# Patient Record
Sex: FEMALE | URBAN - METROPOLITAN AREA
[De-identification: names, ages, dates, MRNs, and addresses within clinical notes are randomized per-mention and may not be internally consistent; named-entity substitution may affect disease eponyms.]

---

## 2021-01-19 ENCOUNTER — OFFICE VISIT (OUTPATIENT)
Dept: URBAN - METROPOLITAN AREA CLINIC 92 | Facility: CLINIC | Age: 71
End: 2021-01-19
Payer: MEDICARE

## 2021-01-19 DIAGNOSIS — R10.84 ABDOMINAL CRAMPING, GENERALIZED: ICD-10-CM

## 2021-01-19 DIAGNOSIS — N18.6 ESRD (END STAGE RENAL DISEASE) ON DIALYSIS: ICD-10-CM

## 2021-01-19 DIAGNOSIS — K59.01 CONSTIPATION: ICD-10-CM

## 2021-01-19 PROCEDURE — G8482 FLU IMMUNIZE ORDER/ADMIN: HCPCS | Performed by: INTERNAL MEDICINE

## 2021-01-19 PROCEDURE — 99203 OFFICE O/P NEW LOW 30 MIN: CPT | Performed by: INTERNAL MEDICINE

## 2021-01-19 PROCEDURE — 99243 OFF/OP CNSLTJ NEW/EST LOW 30: CPT | Performed by: INTERNAL MEDICINE

## 2021-01-19 NOTE — HPI-TODAY'S VISIT:
This is a 70-year-old -American female who presents today for consultation.  She is referred by Dr. Alexy Oliver.  Copy this report will be sent to his office. She notes that she has had years of diffuse abdominal pain mostly in her flanks radiating to her mid abdomen she has associated bloating and abdominal swelling.  She has symptoms daily.  There are no alleviating factors.  This is occasionally worse after eating.  She does have chronic constipation with a bowel movement every 3 to 4 days with pellet-like stools.  She does not take any stool softeners or laxatives at this time.  She was scheduled to have an EGD and colonoscopy with another provider although this with this was canceled in the ambulatory care center due to the patient's comorbid conditions.   she does not have list of her meds

## 2021-02-09 ENCOUNTER — TELEPHONE ENCOUNTER (OUTPATIENT)
Dept: URBAN - METROPOLITAN AREA CLINIC 92 | Facility: CLINIC | Age: 71
End: 2021-02-09

## 2021-02-16 ENCOUNTER — TELEPHONE ENCOUNTER (OUTPATIENT)
Dept: URBAN - METROPOLITAN AREA CLINIC 92 | Facility: CLINIC | Age: 71
End: 2021-02-16

## 2021-02-17 ENCOUNTER — OFFICE VISIT (OUTPATIENT)
Dept: URBAN - METROPOLITAN AREA MEDICAL CENTER 12 | Facility: MEDICAL CENTER | Age: 71
End: 2021-02-17
Payer: MEDICARE

## 2021-02-17 DIAGNOSIS — B37.81 CANDIDA ESOPHAGITIS: ICD-10-CM

## 2021-02-17 DIAGNOSIS — K59.09 CHRONIC CONSTIPATION: ICD-10-CM

## 2021-02-17 DIAGNOSIS — R10.84 ABDOMINAL CRAMPING, GENERALIZED: ICD-10-CM

## 2021-02-17 PROCEDURE — 43239 EGD BIOPSY SINGLE/MULTIPLE: CPT | Performed by: INTERNAL MEDICINE

## 2021-02-17 PROCEDURE — 45378 DIAGNOSTIC COLONOSCOPY: CPT | Performed by: INTERNAL MEDICINE

## 2021-03-02 ENCOUNTER — TELEPHONE ENCOUNTER (OUTPATIENT)
Dept: URBAN - METROPOLITAN AREA CLINIC 92 | Facility: CLINIC | Age: 71
End: 2021-03-02

## 2021-03-02 RX ORDER — FLUCONAZOLE 100 MG/1
2 TABLETS ON DAY 1 THEN 1 TABLET DAILY TABLET ORAL DAILY
Qty: 15 | OUTPATIENT
Start: 2021-03-03 | End: 2021-03-17

## 2021-04-05 ENCOUNTER — OFFICE VISIT (OUTPATIENT)
Dept: URBAN - METROPOLITAN AREA CLINIC 92 | Facility: CLINIC | Age: 71
End: 2021-04-05
Payer: MEDICARE

## 2021-04-05 ENCOUNTER — TELEPHONE ENCOUNTER (OUTPATIENT)
Dept: URBAN - METROPOLITAN AREA CLINIC 92 | Facility: CLINIC | Age: 71
End: 2021-04-05

## 2021-04-05 VITALS
WEIGHT: 149 LBS | HEIGHT: 63 IN | BODY MASS INDEX: 26.4 KG/M2 | SYSTOLIC BLOOD PRESSURE: 159 MMHG | HEART RATE: 94 BPM | DIASTOLIC BLOOD PRESSURE: 82 MMHG

## 2021-04-05 DIAGNOSIS — K59.01 CONSTIPATION: ICD-10-CM

## 2021-04-05 DIAGNOSIS — B37.81 CANDIDAL ESOPHAGITIS: ICD-10-CM

## 2021-04-05 DIAGNOSIS — E61.1 IRON DEFICIENCY: ICD-10-CM

## 2021-04-05 DIAGNOSIS — R10.9 ABDOMINAL PAIN: ICD-10-CM

## 2021-04-05 DIAGNOSIS — K92.1 MELENA: ICD-10-CM

## 2021-04-05 DIAGNOSIS — N18.6 ESRD (END STAGE RENAL DISEASE) ON DIALYSIS: ICD-10-CM

## 2021-04-05 PROBLEM — 46177005 END STAGE RENAL DISEASE: Status: ACTIVE | Noted: 2021-01-19

## 2021-04-05 PROCEDURE — 99214 OFFICE O/P EST MOD 30 MIN: CPT | Performed by: INTERNAL MEDICINE

## 2021-04-05 NOTE — HPI-TODAY'S VISIT:
This is a 70-year-old -American female presents today for follow-up.  She had an upper endoscopy which was notable for Candida esophagitis.  She completed a course of Diflucan.  She overall notes a decrease in her abdominal symptoms with improved pain and decreased swelling although she continues to have constipation with a bowel movement once or twice per week.  She does take MiraLAX as needed although only once or twice per week.  As she states that she has no rectal sensation of urge for defecation and has frequent fecal incontinence.  she again did not have list of meds

## 2021-04-30 ENCOUNTER — OFFICE VISIT (OUTPATIENT)
Dept: URBAN - METROPOLITAN AREA MEDICAL CENTER 28 | Facility: MEDICAL CENTER | Age: 71
End: 2021-04-30
Payer: MEDICARE

## 2021-04-30 DIAGNOSIS — K59.09 CHRONIC CONSTIPATION: ICD-10-CM

## 2021-04-30 PROCEDURE — 91120 RECTAL SENSATION TEST: CPT | Performed by: INTERNAL MEDICINE

## 2021-04-30 PROCEDURE — 91122 ANAL PRESSURE RECORD: CPT | Performed by: INTERNAL MEDICINE

## 2021-05-04 ENCOUNTER — TELEPHONE ENCOUNTER (OUTPATIENT)
Dept: URBAN - METROPOLITAN AREA CLINIC 92 | Facility: CLINIC | Age: 71
End: 2021-05-04

## 2021-05-05 ENCOUNTER — OFFICE VISIT (OUTPATIENT)
Dept: URBAN - METROPOLITAN AREA CLINIC 91 | Facility: CLINIC | Age: 71
End: 2021-05-05

## 2021-05-12 PROBLEM — 14760008 CONSTIPATION: Status: ACTIVE | Noted: 2021-01-19

## 2021-05-24 ENCOUNTER — OFFICE VISIT (OUTPATIENT)
Dept: URBAN - METROPOLITAN AREA MEDICAL CENTER 28 | Facility: MEDICAL CENTER | Age: 71
End: 2021-05-24
Payer: MEDICARE

## 2021-05-24 ENCOUNTER — LAB OUTSIDE AN ENCOUNTER (OUTPATIENT)
Dept: URBAN - METROPOLITAN AREA CLINIC 92 | Facility: CLINIC | Age: 71
End: 2021-05-24

## 2021-05-24 DIAGNOSIS — R15.9 ANAL SPHINCTER INCONTINENCE: ICD-10-CM

## 2021-05-24 DIAGNOSIS — K63.89 BACTERIAL OVERGROWTH SYNDROME: ICD-10-CM

## 2021-05-24 LAB
.IONIZED CALCIUM POC: 1.21
BUN POC: 35
CHLORIDE LVL POC: 106
CO2 POC: 28
CREATININE POC: 6.4
GLUCOSE  ISTAT: 103
HEMATOCRIT POC: 35
HEMOBLOBIN POC: 11.9
PERFORMING LAB: (no result)
POTASSIUM POC: 3.9
SODIUM LVL POC: 144

## 2021-05-24 PROCEDURE — 45341 SIGMOIDOSCOPY W/ULTRASOUND: CPT | Performed by: INTERNAL MEDICINE

## 2021-06-02 ENCOUNTER — OFFICE VISIT (OUTPATIENT)
Dept: URBAN - METROPOLITAN AREA CLINIC 91 | Facility: CLINIC | Age: 71
End: 2021-06-02

## 2021-06-09 ENCOUNTER — OFFICE VISIT (OUTPATIENT)
Dept: URBAN - METROPOLITAN AREA CLINIC 91 | Facility: CLINIC | Age: 71
End: 2021-06-09

## 2021-06-09 ENCOUNTER — CLAIMS CREATED FROM THE CLAIM WINDOW (OUTPATIENT)
Dept: URBAN - METROPOLITAN AREA CLINIC 91 | Facility: CLINIC | Age: 71
End: 2021-06-09
Payer: MEDICARE

## 2021-06-09 ENCOUNTER — CLAIMS CREATED FROM THE CLAIM WINDOW (OUTPATIENT)
Dept: URBAN - METROPOLITAN AREA CLINIC 91 | Facility: CLINIC | Age: 71
End: 2021-06-09

## 2021-06-09 DIAGNOSIS — D50.9 ANEMIA, IRON DEFICIENCY: ICD-10-CM

## 2021-06-09 PROCEDURE — 91110 GI TRC IMG INTRAL ESOPH-ILE: CPT | Performed by: INTERNAL MEDICINE

## 2021-06-11 ENCOUNTER — TELEPHONE ENCOUNTER (OUTPATIENT)
Dept: URBAN - METROPOLITAN AREA CLINIC 92 | Facility: CLINIC | Age: 71
End: 2021-06-11

## 2024-05-06 ENCOUNTER — P2P PATIENT RECORD (OUTPATIENT)
Age: 74
End: 2024-05-06

## 2024-05-22 ENCOUNTER — TELEPHONE ENCOUNTER (OUTPATIENT)
Dept: URBAN - METROPOLITAN AREA CLINIC 27 | Facility: CLINIC | Age: 74
End: 2024-05-22

## 2024-05-22 ENCOUNTER — OFFICE VISIT (OUTPATIENT)
Dept: URBAN - METROPOLITAN AREA CLINIC 27 | Facility: CLINIC | Age: 74
End: 2024-05-22
Payer: COMMERCIAL

## 2024-05-22 VITALS
HEIGHT: 63 IN | BODY MASS INDEX: 23.21 KG/M2 | SYSTOLIC BLOOD PRESSURE: 117 MMHG | HEART RATE: 76 BPM | WEIGHT: 131 LBS | DIASTOLIC BLOOD PRESSURE: 66 MMHG

## 2024-05-22 DIAGNOSIS — R63.4 WEIGHT LOSS: ICD-10-CM

## 2024-05-22 DIAGNOSIS — R10.84 GENERALIZED ABDOMINAL PAIN: ICD-10-CM

## 2024-05-22 DIAGNOSIS — R19.8 IRREGULAR BOWEL HABITS: ICD-10-CM

## 2024-05-22 DIAGNOSIS — R13.10 DYSPHAGIA: ICD-10-CM

## 2024-05-22 DIAGNOSIS — K21.9 GERD: ICD-10-CM

## 2024-05-22 PROCEDURE — 99204 OFFICE O/P NEW MOD 45 MIN: CPT | Performed by: INTERNAL MEDICINE

## 2024-05-22 RX ORDER — SACUBITRIL AND VALSARTAN 24; 26 MG/1; MG/1
TABLET, FILM COATED ORAL
Qty: 180 TABLET | Status: ACTIVE | COMMUNITY

## 2024-05-22 RX ORDER — METHOCARBAMOL 500 MG/1
TAKE 1 TABLET (500 MG) BY MOUTH IF NEEDED IN THE MORNING, AT NOON, AND AT BEDTIME FOR MUSCLE SPASMS TABLET, FILM COATED ORAL
Qty: 60 EACH | Refills: 4 | Status: ACTIVE | COMMUNITY

## 2024-05-22 RX ORDER — APIXABAN 5 MG/1
TABLET, FILM COATED ORAL
Qty: 180 TABLET | Status: ACTIVE | COMMUNITY

## 2024-05-22 RX ORDER — ATORVASTATIN CALCIUM 80 MG/1
TAKE 1 TABLET BY MOUTH ONCE DAILY TABLET, FILM COATED ORAL
Qty: 90 EACH | Refills: 3 | Status: ACTIVE | COMMUNITY

## 2024-05-22 RX ORDER — INSULIN LISPRO 100 [IU]/ML
PLEASE SEE ATTACHED FOR DETAILED DIRECTIONS INJECTION, SOLUTION INTRAVENOUS; SUBCUTANEOUS
Qty: 15 MILLILITER | Refills: 0 | Status: ACTIVE | COMMUNITY

## 2024-05-22 RX ORDER — HYDROXYZINE HYDROCHLORIDE 10 MG/1
TAKE 1 TABLET (10 MG) BY MOUTH IF NEEDED IN THE MORNING, AT NOON, AND AT BEDTIME FOR ITCHING TABLET, FILM COATED ORAL
Qty: 90 EACH | Refills: 0 | Status: ACTIVE | COMMUNITY

## 2024-05-22 RX ORDER — TOPIRAMATE 25 MG/1
CAPSULE, COATED PELLETS ORAL
Qty: 180 CAPSULE | Status: ACTIVE | COMMUNITY

## 2024-05-22 RX ORDER — OMEPRAZOLE 40 MG/1
TAKE 1 CAPSULE BY MOUTH TWICE A DAY CAPSULE, DELAYED RELEASE ORAL
Qty: 180 EACH | Refills: 1 | Status: ACTIVE | COMMUNITY

## 2024-05-22 RX ORDER — PREDNISONE 5 MG/1
PLEASE SEE ATTACHED FOR DETAILED DIRECTIONS TABLET ORAL
Qty: 218 EACH | Refills: 0 | Status: ACTIVE | COMMUNITY

## 2024-05-22 RX ORDER — LIDOCAINE 50 MG/G
PATCH CUTANEOUS
Qty: 30 PATCH | Status: ACTIVE | COMMUNITY

## 2024-05-22 RX ORDER — BLOOD SUGAR DIAGNOSTIC
CHECK BLOOD SUGAR 3X/DAY. E11.8 STRIP MISCELLANEOUS
Qty: 300 EACH | Refills: 1 | Status: ACTIVE | COMMUNITY

## 2024-05-22 RX ORDER — LANCETS
USE AS DIRECTED. INJECTS 1 X A DAY EACH MISCELLANEOUS
Qty: 100 EACH | Refills: 0 | Status: ACTIVE | COMMUNITY

## 2024-05-22 RX ORDER — AMLODIPINE BESYLATE 10 MG/1
TABLET ORAL
Qty: 90 TABLET | Status: ACTIVE | COMMUNITY

## 2024-05-22 RX ORDER — ALBUTEROL SULFATE 90 UG/1
INHALE 2 PUFFS EVERY 6 HOURS AS NEEDED FOR SHORTNESS OF BREATH AEROSOL, METERED RESPIRATORY (INHALATION)
Qty: 8.5 GRAM | Refills: 1 | Status: ACTIVE | COMMUNITY

## 2024-05-22 RX ORDER — NALOXONE HYDROCHLORIDE 4 MG/.1ML
PLEASE SEE ATTACHED FOR DETAILED DIRECTIONS SPRAY, METERED NASAL
Qty: 2 EACH | Refills: 0 | Status: ACTIVE | COMMUNITY

## 2024-05-22 RX ORDER — BOSENTAN 125 MG/1
TABLET, FILM COATED ORAL
Qty: 60 TABLET | Status: ACTIVE | COMMUNITY

## 2024-05-22 RX ORDER — HYDROCORTISONE 25 MG/G
CREAM TOPICAL
Qty: 30 GRAM | Status: ACTIVE | COMMUNITY

## 2024-05-22 RX ORDER — INSULIN LISPRO 100 [IU]/ML
INJECT 35 UNITS UNDER THE SKIN WITH BREAKFAST, WITH LUNCH, AND WITH EVENING MEAL INJECTION, SOLUTION INTRAVENOUS; SUBCUTANEOUS
Qty: 105 MILLILITER | Refills: 0 | Status: ACTIVE | COMMUNITY

## 2024-05-22 RX ORDER — CALCIUM ACETATE 667 MG/1
CAPSULE ORAL
Qty: 540 CAPSULE | Status: ACTIVE | COMMUNITY

## 2024-05-22 RX ORDER — METOPROLOL SUCCINATE 25 MG/1
TAKE 1 TABLET (25 MG) BY MOUTH DAILY DO NOT CRUSH OR CHEW TABLET, EXTENDED RELEASE ORAL
Qty: 90 EACH | Refills: 0 | Status: ACTIVE | COMMUNITY

## 2024-05-22 RX ORDER — GABAPENTIN 300 MG/1
CAPSULE ORAL
Qty: 60 CAPSULE | Status: ACTIVE | COMMUNITY

## 2024-05-22 RX ORDER — HYDRALAZINE HYDROCHLORIDE 25 MG/1
TAKE 1 TABLET BY MOUTH IF NEEDED IN THE MORNING AND AT BEDTIME (HIGH BLOOD PRESSURE) TABLET ORAL
Qty: 90 EACH | Refills: 0 | Status: ACTIVE | COMMUNITY

## 2024-05-22 RX ORDER — INSULIN LISPRO 100 [IU]/ML
INJECTION, SOLUTION INTRAVENOUS; SUBCUTANEOUS
Qty: 15 MILLILITER | Status: ACTIVE | COMMUNITY

## 2024-05-22 RX ORDER — HYOSCYAMINE SULFATE 0.12 MG/1
1 OR 2 TABLETS AS NEEDED FOR ABDOMINAL PAIN TABLET ORAL EVERY 6 HOURS
Qty: 90 | Refills: 2 | OUTPATIENT
Start: 2024-05-22 | End: 2024-08-20

## 2024-05-22 RX ORDER — LOSARTAN POTASSIUM 25 MG/1
1 TAB(S) BY MOUTH AT BEDTIME INSTR:OK TO START 1/2 PILL HOLD ON NIGHT BEFORE DIALYSIS TABLET, FILM COATED ORAL
Qty: 90 EACH | Refills: 0 | Status: ACTIVE | COMMUNITY

## 2024-05-22 RX ORDER — DULOXETINE 20 MG/1
TAKE 1 CAPSULE (20 MG) BY MOUTH ONCE DAILY DO NOT CRUSH OR CHEW CAPSULE, DELAYED RELEASE ORAL
Qty: 90 EACH | Refills: 2 | Status: ACTIVE | COMMUNITY

## 2024-05-22 RX ORDER — CYCLOBENZAPRINE HYDROCHLORIDE 10 MG/1
TABLET, FILM COATED ORAL
Qty: 45 TABLET | Status: ACTIVE | COMMUNITY

## 2024-05-22 RX ORDER — ROPINIROLE HYDROCHLORIDE 0.5 MG/1
TABLET, FILM COATED ORAL
Qty: 90 TABLET | Status: ACTIVE | COMMUNITY

## 2024-05-22 RX ORDER — ADALIMUMAB 40MG/0.4ML
KIT SUBCUTANEOUS
Qty: 2 EACH | Status: ACTIVE | COMMUNITY

## 2024-05-22 RX ORDER — CALCIUM ACETATE 667 MG/1
TAKE 2 TABLETS BY MOUTH WITH EACH MEAL 4 TIMES DAILY AS DIRECTED TABLET ORAL
Qty: 240 EACH | Refills: 0 | Status: ACTIVE | COMMUNITY

## 2024-05-22 RX ORDER — PEN NEEDLE, DIABETIC 32GX 5/32"
BD NANO PEN NEEDLES INJECTS INSULIN ONCE DAILY NEEDLE, DISPOSABLE MISCELLANEOUS
Qty: 100 EACH | Refills: 0 | Status: ACTIVE | COMMUNITY

## 2024-05-22 RX ORDER — METHYLPREDNISOLONE 32 MG/1
TABLET ORAL
Qty: 2 TABLET | Status: ACTIVE | COMMUNITY

## 2024-05-22 RX ORDER — SITAGLIPTIN 25 MG/1
TABLET, FILM COATED ORAL
Qty: 90 TABLET | Status: ACTIVE | COMMUNITY

## 2024-05-22 RX ORDER — GABAPENTIN 100 MG/1
CAPSULE ORAL
Qty: 12 CAPSULE | Status: ACTIVE | COMMUNITY

## 2024-05-23 ENCOUNTER — DASHBOARD ENCOUNTERS (OUTPATIENT)
Age: 74
End: 2024-05-23

## 2024-05-23 ENCOUNTER — TELEPHONE ENCOUNTER (OUTPATIENT)
Dept: URBAN - METROPOLITAN AREA CLINIC 27 | Facility: CLINIC | Age: 74
End: 2024-05-23

## 2024-06-17 ENCOUNTER — OFFICE VISIT (OUTPATIENT)
Dept: URBAN - METROPOLITAN AREA CLINIC 27 | Facility: CLINIC | Age: 74
End: 2024-06-17
Payer: COMMERCIAL

## 2024-06-17 ENCOUNTER — LAB OUTSIDE AN ENCOUNTER (OUTPATIENT)
Dept: URBAN - METROPOLITAN AREA CLINIC 27 | Facility: CLINIC | Age: 74
End: 2024-06-17

## 2024-06-17 ENCOUNTER — TELEPHONE ENCOUNTER (OUTPATIENT)
Dept: URBAN - METROPOLITAN AREA CLINIC 27 | Facility: CLINIC | Age: 74
End: 2024-06-17

## 2024-06-17 VITALS
SYSTOLIC BLOOD PRESSURE: 117 MMHG | DIASTOLIC BLOOD PRESSURE: 66 MMHG | WEIGHT: 126 LBS | BODY MASS INDEX: 22.32 KG/M2 | HEIGHT: 63 IN | HEART RATE: 76 BPM

## 2024-06-17 DIAGNOSIS — K59.09 CONSTIPATION: ICD-10-CM

## 2024-06-17 DIAGNOSIS — R10.84 GENERALIZED ABDOMINAL PAIN: ICD-10-CM

## 2024-06-17 DIAGNOSIS — K21.9 GERD: ICD-10-CM

## 2024-06-17 DIAGNOSIS — R13.19 CERVICAL DYSPHAGIA: ICD-10-CM

## 2024-06-17 PROCEDURE — 99214 OFFICE O/P EST MOD 30 MIN: CPT | Performed by: INTERNAL MEDICINE

## 2024-06-17 RX ORDER — HYDROCORTISONE 25 MG/G
CREAM TOPICAL
Qty: 30 GRAM | Status: ACTIVE | COMMUNITY

## 2024-06-17 RX ORDER — DULOXETINE 20 MG/1
TAKE 1 CAPSULE (20 MG) BY MOUTH ONCE DAILY DO NOT CRUSH OR CHEW CAPSULE, DELAYED RELEASE ORAL
Qty: 90 EACH | Refills: 2 | Status: ACTIVE | COMMUNITY

## 2024-06-17 RX ORDER — AMLODIPINE BESYLATE 10 MG/1
TABLET ORAL
Qty: 90 TABLET | Status: ACTIVE | COMMUNITY

## 2024-06-17 RX ORDER — INSULIN LISPRO 100 [IU]/ML
INJECTION, SOLUTION INTRAVENOUS; SUBCUTANEOUS
Qty: 15 MILLILITER | Status: ACTIVE | COMMUNITY

## 2024-06-17 RX ORDER — GABAPENTIN 100 MG/1
CAPSULE ORAL
Qty: 12 CAPSULE | Status: ACTIVE | COMMUNITY

## 2024-06-17 RX ORDER — CYCLOBENZAPRINE HYDROCHLORIDE 10 MG/1
TABLET, FILM COATED ORAL
Qty: 45 TABLET | Status: ACTIVE | COMMUNITY

## 2024-06-17 RX ORDER — TOPIRAMATE 25 MG/1
CAPSULE, COATED PELLETS ORAL
Qty: 180 CAPSULE | Status: ACTIVE | COMMUNITY

## 2024-06-17 RX ORDER — INSULIN LISPRO 100 [IU]/ML
PLEASE SEE ATTACHED FOR DETAILED DIRECTIONS INJECTION, SOLUTION INTRAVENOUS; SUBCUTANEOUS
Qty: 15 MILLILITER | Refills: 0 | Status: ACTIVE | COMMUNITY

## 2024-06-17 RX ORDER — HYDROXYZINE HYDROCHLORIDE 10 MG/1
TAKE 1 TABLET (10 MG) BY MOUTH IF NEEDED IN THE MORNING, AT NOON, AND AT BEDTIME FOR ITCHING TABLET, FILM COATED ORAL
Qty: 90 EACH | Refills: 0 | Status: ACTIVE | COMMUNITY

## 2024-06-17 RX ORDER — APIXABAN 5 MG/1
TABLET, FILM COATED ORAL
Qty: 180 TABLET | Status: ACTIVE | COMMUNITY

## 2024-06-17 RX ORDER — CALCIUM ACETATE 667 MG/1
CAPSULE ORAL
Qty: 540 CAPSULE | Status: ACTIVE | COMMUNITY

## 2024-06-17 RX ORDER — SACUBITRIL AND VALSARTAN 24; 26 MG/1; MG/1
TABLET, FILM COATED ORAL
Qty: 180 TABLET | Status: ACTIVE | COMMUNITY

## 2024-06-17 RX ORDER — METHYLPREDNISOLONE 32 MG/1
TABLET ORAL
Qty: 2 TABLET | Status: ON HOLD | COMMUNITY

## 2024-06-17 RX ORDER — CALCIUM ACETATE 667 MG/1
TAKE 2 TABLETS BY MOUTH WITH EACH MEAL 4 TIMES DAILY AS DIRECTED TABLET ORAL
Qty: 240 EACH | Refills: 0 | Status: ACTIVE | COMMUNITY

## 2024-06-17 RX ORDER — METHOCARBAMOL 500 MG/1
TAKE 1 TABLET (500 MG) BY MOUTH IF NEEDED IN THE MORNING, AT NOON, AND AT BEDTIME FOR MUSCLE SPASMS TABLET, FILM COATED ORAL
Qty: 60 EACH | Refills: 4 | Status: ACTIVE | COMMUNITY

## 2024-06-17 RX ORDER — NALOXONE HYDROCHLORIDE 4 MG/.1ML
PLEASE SEE ATTACHED FOR DETAILED DIRECTIONS SPRAY, METERED NASAL
Qty: 2 EACH | Refills: 0 | Status: ACTIVE | COMMUNITY

## 2024-06-17 RX ORDER — GABAPENTIN 300 MG/1
CAPSULE ORAL
Qty: 60 CAPSULE | Status: ACTIVE | COMMUNITY

## 2024-06-17 RX ORDER — BLOOD SUGAR DIAGNOSTIC
CHECK BLOOD SUGAR 3X/DAY. E11.8 STRIP MISCELLANEOUS
Qty: 300 EACH | Refills: 1 | Status: ON HOLD | COMMUNITY

## 2024-06-17 RX ORDER — HYOSCYAMINE SULFATE 0.12 MG/1
1 OR 2 TABLETS AS NEEDED FOR ABDOMINAL PAIN TABLET ORAL EVERY 6 HOURS
Qty: 90 | Refills: 2 | Status: ACTIVE | COMMUNITY
Start: 2024-05-22 | End: 2024-08-20

## 2024-06-17 RX ORDER — ADALIMUMAB 40MG/0.4ML
KIT SUBCUTANEOUS
Qty: 2 EACH | Status: ACTIVE | COMMUNITY

## 2024-06-17 RX ORDER — PREDNISONE 2.5 MG/1
TAKE 2 TABLETS (5 MG) BY MOUTH ONCE DAILY TABLET ORAL
Qty: 60 EACH | Refills: 0 | Status: ON HOLD | COMMUNITY

## 2024-06-17 RX ORDER — ATORVASTATIN CALCIUM 80 MG/1
TAKE 1 TABLET BY MOUTH ONCE DAILY TABLET, FILM COATED ORAL
Qty: 90 EACH | Refills: 3 | Status: ACTIVE | COMMUNITY

## 2024-06-17 RX ORDER — PEN NEEDLE, DIABETIC 32GX 5/32"
BD NANO PEN NEEDLES INJECTS INSULIN ONCE DAILY NEEDLE, DISPOSABLE MISCELLANEOUS
Qty: 100 EACH | Refills: 0 | Status: ON HOLD | COMMUNITY

## 2024-06-17 RX ORDER — ALBUTEROL SULFATE 90 UG/1
INHALE 2 PUFFS EVERY 6 HOURS AS NEEDED FOR SHORTNESS OF BREATH AEROSOL, METERED RESPIRATORY (INHALATION)
Qty: 8.5 GRAM | Refills: 1 | Status: ACTIVE | COMMUNITY

## 2024-06-17 RX ORDER — ROPINIROLE HYDROCHLORIDE 0.5 MG/1
TABLET, FILM COATED ORAL
Qty: 90 TABLET | Status: ACTIVE | COMMUNITY

## 2024-06-17 RX ORDER — BOSENTAN 125 MG/1
TABLET, FILM COATED ORAL
Qty: 60 TABLET | Status: ACTIVE | COMMUNITY

## 2024-06-17 RX ORDER — LANCETS
USE AS DIRECTED. INJECTS 1 X A DAY EACH MISCELLANEOUS
Qty: 100 EACH | Refills: 0 | Status: ACTIVE | COMMUNITY

## 2024-06-17 RX ORDER — SITAGLIPTIN 25 MG/1
TABLET, FILM COATED ORAL
Qty: 90 TABLET | Status: ACTIVE | COMMUNITY

## 2024-06-17 RX ORDER — PREDNISONE 5 MG/1
PLEASE SEE ATTACHED FOR DETAILED DIRECTIONS TABLET ORAL
Qty: 218 EACH | Refills: 0 | Status: ACTIVE | COMMUNITY

## 2024-06-17 RX ORDER — CYCLOBENZAPRINE HYDROCHLORIDE 10 MG/1
PLEASE SEE ATTACHED FOR DETAILED DIRECTIONS TABLET, FILM COATED ORAL
Qty: 45 EACH | Refills: 1 | Status: ACTIVE | COMMUNITY

## 2024-06-17 RX ORDER — LIDOCAINE 50 MG/G
PATCH CUTANEOUS
Qty: 30 PATCH | Status: ACTIVE | COMMUNITY

## 2024-06-17 RX ORDER — HYDRALAZINE HYDROCHLORIDE 25 MG/1
TAKE 1 TABLET BY MOUTH IF NEEDED IN THE MORNING AND AT BEDTIME (HIGH BLOOD PRESSURE) TABLET ORAL
Qty: 90 EACH | Refills: 0 | Status: ACTIVE | COMMUNITY

## 2024-06-17 RX ORDER — OMEPRAZOLE 40 MG/1
TAKE 1 CAPSULE BY MOUTH TWICE A DAY CAPSULE, DELAYED RELEASE ORAL
Qty: 180 EACH | Refills: 1 | Status: ACTIVE | COMMUNITY

## 2024-06-17 RX ORDER — LOSARTAN POTASSIUM 25 MG/1
1 TAB(S) BY MOUTH AT BEDTIME INSTR:OK TO START 1/2 PILL HOLD ON NIGHT BEFORE DIALYSIS TABLET, FILM COATED ORAL
Qty: 90 EACH | Refills: 0 | Status: ACTIVE | COMMUNITY

## 2024-06-17 RX ORDER — INSULIN LISPRO 100 [IU]/ML
INJECT 35 UNITS UNDER THE SKIN WITH BREAKFAST, WITH LUNCH, AND WITH EVENING MEAL INJECTION, SOLUTION INTRAVENOUS; SUBCUTANEOUS
Qty: 105 MILLILITER | Refills: 0 | Status: ACTIVE | COMMUNITY

## 2024-06-17 RX ORDER — BOSENTAN 125 MG/1
TAKE 1 TABLET (125 MG) BY MOUTH TWICE DAILY. DO NOT CRUSH OR CHEW TABLET, FILM COATED ORAL
Qty: 60 EACH | Refills: 2 | Status: ACTIVE | COMMUNITY

## 2024-06-17 RX ORDER — METOPROLOL SUCCINATE 25 MG/1
TAKE 1 TABLET (25 MG) BY MOUTH DAILY DO NOT CRUSH OR CHEW TABLET, EXTENDED RELEASE ORAL
Qty: 90 EACH | Refills: 0 | Status: ACTIVE | COMMUNITY

## 2024-06-17 NOTE — PHYSICAL EXAM GASTROINTESTINAL
Abdomen is soft, generalized TTP, nondistended, no guarding or rigidity, no masses palpable, normal bowel sounds; mild tympany

## 2024-06-17 NOTE — HPI-TODAY'S VISIT:
Patient here for GI followup. She seems to be a somewhat suboptimal historian today, and does not have her medication list with her. She continues to have generalized abdominal pain. The pain is relatively constant and noted over the entire abdomen.  It is occasionally nocturnal.  She denies any trigger for the abdominal pain.  It is unaffected by eating or having a bowel movement.  It is better with taking lyrica. It is unclear whether she is taking the hyoscyamine that was prescribed to her at last visit. The pain is not nocturnal. She has suboptimally-controlled reflux symptoms, and apparently "ran out" of her omeprazole 40mg Rx a few months ago. She has not yet filled this (?reason).  This Rx was very helpful when she did take it, however. She is not adherent to an antireflux regimen. She has occasional dysphagia to solids though this is does not to be very bothersome to her and she does not need to regurgitate the food bolus. She apparently lost 25 pounds earlier this year while hospitalized in February; she states that she continues to lose weight.  She continues to have problems with constipation, and apparently all OTC laxatives have been unhelpful. She apparently was recently diagnosed with "adrenal crisis". She frequently mentions at today's OV that "I have a mass in my stomach and nobody is doing anything about it". However, she had a noncontrasted CT abdomen pelvis in February which revealed a significant colonic stool burden but otherwise unremarkable. She denies anxiety or depression. She has a history of rheumatoid arthritis and Sjogren syndrome and is followed by a rheumatologist.  She is on Humira. Her hospital medication list includes Creon, though it is unclear whether and why she is taking this, as she does not have her medication list with her. Her last colonoscopy was in 2021; this was apparently unremarkable.  She does have end-stage renal disease and is on hemodialysis. She was seen by [facility] earlier this year, and was scheduled for anorectal manometry, but did not keep that appointment. She is on Eliquis.  She has pulmonary hypertension and apparently is on home oxygen.  She was admitted to [facility] in February with a 10-day history of loose stools and fecal incontinence and perianal numbness.  Stool studies were negative. She there is no family history of colon cancer or polyps, per her report. She underwent EGD in early 2021 which was unremarkable. She apparently had a capsule endoscopy in 2021 which was also unremarkable, though neither of these reports are available. Recent labs show a hemoglobin of 10.1, normal liver enzymes.

## 2024-06-20 ENCOUNTER — ERX REFILL RESPONSE (OUTPATIENT)
Dept: URBAN - METROPOLITAN AREA CLINIC 27 | Facility: CLINIC | Age: 74
End: 2024-06-20

## 2024-06-20 RX ORDER — HYOSCYAMINE SULFATE 0.12 MG/1
1 OR 2 TABLETS AS NEEDED FOR ABDOMINAL PAIN TABLET ORAL EVERY 6 HOURS
Qty: 90 | Refills: 2 | OUTPATIENT

## 2024-06-20 RX ORDER — HYOSCYAMINE SULFATE 0.12 MG/1
1 OR 2 TABLETS TABLET ORAL EVERY 6 HOURS
Qty: 270 | Refills: 3 | OUTPATIENT

## 2024-07-19 ENCOUNTER — OFFICE VISIT (OUTPATIENT)
Dept: URBAN - METROPOLITAN AREA CLINIC 27 | Facility: CLINIC | Age: 74
End: 2024-07-19
Payer: COMMERCIAL

## 2024-07-19 VITALS
DIASTOLIC BLOOD PRESSURE: 49 MMHG | HEART RATE: 73 BPM | SYSTOLIC BLOOD PRESSURE: 98 MMHG | HEIGHT: 63 IN | BODY MASS INDEX: 23.39 KG/M2 | WEIGHT: 132 LBS

## 2024-07-19 DIAGNOSIS — R13.19 CERVICAL DYSPHAGIA: ICD-10-CM

## 2024-07-19 DIAGNOSIS — R10.84 GENERALIZED ABDOMINAL PAIN: ICD-10-CM

## 2024-07-19 DIAGNOSIS — K21.9 GERD: ICD-10-CM

## 2024-07-19 DIAGNOSIS — K59.09 CONSTIPATION: ICD-10-CM

## 2024-07-19 PROCEDURE — 99214 OFFICE O/P EST MOD 30 MIN: CPT | Performed by: INTERNAL MEDICINE

## 2024-07-19 RX ORDER — METHOCARBAMOL 500 MG/1
TAKE 1 TABLET (500 MG) BY MOUTH IF NEEDED IN THE MORNING, AT NOON, AND AT BEDTIME FOR MUSCLE SPASMS TABLET, FILM COATED ORAL
Qty: 60 EACH | Refills: 4 | Status: DISCONTINUED | COMMUNITY

## 2024-07-19 RX ORDER — INSULIN LISPRO 100 [IU]/ML
INJECT 35 UNITS UNDER THE SKIN WITH BREAKFAST, WITH LUNCH, AND WITH EVENING MEAL INJECTION, SOLUTION INTRAVENOUS; SUBCUTANEOUS
Qty: 105 MILLILITER | Refills: 0 | Status: ACTIVE | COMMUNITY

## 2024-07-19 RX ORDER — METOPROLOL SUCCINATE 25 MG/1
TAKE 1 TABLET (25 MG) BY MOUTH DAILY DO NOT CRUSH OR CHEW TABLET, EXTENDED RELEASE ORAL
Qty: 90 EACH | Refills: 0 | Status: DISCONTINUED | COMMUNITY

## 2024-07-19 RX ORDER — ADALIMUMAB 40MG/0.4ML
KIT SUBCUTANEOUS
Qty: 2 EACH | Status: ACTIVE | COMMUNITY

## 2024-07-19 RX ORDER — APIXABAN 5 MG/1
TABLET, FILM COATED ORAL
Qty: 180 TABLET | Status: ACTIVE | COMMUNITY

## 2024-07-19 RX ORDER — INSULIN LISPRO 100 [IU]/ML
INJECTION, SOLUTION INTRAVENOUS; SUBCUTANEOUS
Qty: 15 MILLILITER | Status: DISCONTINUED | COMMUNITY

## 2024-07-19 RX ORDER — TOPIRAMATE 25 MG/1
CAPSULE, COATED PELLETS ORAL
Qty: 180 CAPSULE | Status: DISCONTINUED | COMMUNITY

## 2024-07-19 RX ORDER — LOSARTAN POTASSIUM 25 MG/1
1 TAB(S) BY MOUTH AT BEDTIME INSTR:OK TO START 1/2 PILL HOLD ON NIGHT BEFORE DIALYSIS TABLET, FILM COATED ORAL
Qty: 90 EACH | Refills: 0 | Status: ACTIVE | COMMUNITY

## 2024-07-19 RX ORDER — GABAPENTIN 300 MG/1
CAPSULE ORAL
Qty: 60 CAPSULE | Status: DISCONTINUED | COMMUNITY

## 2024-07-19 RX ORDER — ROPINIROLE HYDROCHLORIDE 0.5 MG/1
TABLET, FILM COATED ORAL
Qty: 90 TABLET | Status: ACTIVE | COMMUNITY

## 2024-07-19 RX ORDER — CYCLOBENZAPRINE HYDROCHLORIDE 10 MG/1
TABLET, FILM COATED ORAL
Qty: 45 TABLET | Status: DISCONTINUED | COMMUNITY

## 2024-07-19 RX ORDER — BLOOD SUGAR DIAGNOSTIC
CHECK BLOOD SUGAR 3X/DAY. E11.8 STRIP MISCELLANEOUS
Qty: 300 EACH | Refills: 1 | Status: ON HOLD | COMMUNITY

## 2024-07-19 RX ORDER — CALCIUM ACETATE 667 MG/1
CAPSULE ORAL
Qty: 540 CAPSULE | Status: ACTIVE | COMMUNITY

## 2024-07-19 RX ORDER — HYDROCORTISONE 25 MG/G
CREAM TOPICAL
Qty: 30 GRAM | Status: ON HOLD | COMMUNITY

## 2024-07-19 RX ORDER — LANCETS
USE AS DIRECTED. INJECTS 1 X A DAY EACH MISCELLANEOUS
Qty: 100 EACH | Refills: 0 | Status: DISCONTINUED | COMMUNITY

## 2024-07-19 RX ORDER — HYDROXYZINE HYDROCHLORIDE 10 MG/1
TAKE 1 TABLET (10 MG) BY MOUTH IF NEEDED IN THE MORNING, AT NOON, AND AT BEDTIME FOR ITCHING TABLET, FILM COATED ORAL
Qty: 90 EACH | Refills: 0 | Status: DISCONTINUED | COMMUNITY

## 2024-07-19 RX ORDER — PREDNISONE 5 MG/1
PLEASE SEE ATTACHED FOR DETAILED DIRECTIONS TABLET ORAL
Qty: 218 EACH | Refills: 0 | Status: DISCONTINUED | COMMUNITY

## 2024-07-19 RX ORDER — PEN NEEDLE, DIABETIC 32GX 5/32"
BD NANO PEN NEEDLES INJECTS INSULIN ONCE DAILY NEEDLE, DISPOSABLE MISCELLANEOUS
Qty: 100 EACH | Refills: 0 | Status: ON HOLD | COMMUNITY

## 2024-07-19 RX ORDER — ALBUTEROL SULFATE 90 UG/1
INHALE 2 PUFFS EVERY 6 HOURS AS NEEDED FOR SHORTNESS OF BREATH AEROSOL, METERED RESPIRATORY (INHALATION)
Qty: 8.5 GRAM | Refills: 1 | Status: DISCONTINUED | COMMUNITY

## 2024-07-19 RX ORDER — HYDRALAZINE HYDROCHLORIDE 25 MG/1
TAKE 1 TABLET BY MOUTH IF NEEDED IN THE MORNING AND AT BEDTIME (HIGH BLOOD PRESSURE) TABLET ORAL
Qty: 90 EACH | Refills: 0 | Status: ACTIVE | COMMUNITY

## 2024-07-19 RX ORDER — METHYLPREDNISOLONE 32 MG/1
TABLET ORAL
Qty: 2 TABLET | Status: ON HOLD | COMMUNITY

## 2024-07-19 RX ORDER — GABAPENTIN 100 MG/1
CAPSULE ORAL
Qty: 12 CAPSULE | Status: DISCONTINUED | COMMUNITY

## 2024-07-19 RX ORDER — SITAGLIPTIN 25 MG/1
TABLET, FILM COATED ORAL
Qty: 90 TABLET | Status: ACTIVE | COMMUNITY

## 2024-07-19 RX ORDER — ATORVASTATIN CALCIUM 80 MG/1
TAKE 1 TABLET BY MOUTH ONCE DAILY TABLET, FILM COATED ORAL
Qty: 90 EACH | Refills: 3 | Status: ACTIVE | COMMUNITY

## 2024-07-19 RX ORDER — NALOXONE HYDROCHLORIDE 4 MG/.1ML
PLEASE SEE ATTACHED FOR DETAILED DIRECTIONS SPRAY, METERED NASAL
Qty: 2 EACH | Refills: 0 | Status: DISCONTINUED | COMMUNITY

## 2024-07-19 RX ORDER — SACUBITRIL AND VALSARTAN 24; 26 MG/1; MG/1
TABLET, FILM COATED ORAL
Qty: 180 TABLET | Status: ACTIVE | COMMUNITY

## 2024-07-19 RX ORDER — AMLODIPINE BESYLATE 10 MG/1
TABLET ORAL
Qty: 90 TABLET | Status: ACTIVE | COMMUNITY

## 2024-07-19 RX ORDER — CALCIUM ACETATE 667 MG/1
CAPSULE ORAL
Qty: 540 CAPSULE | Status: DISCONTINUED | COMMUNITY

## 2024-07-19 RX ORDER — CALCIUM ACETATE 667 MG/1
TAKE 2 TABLETS BY MOUTH WITH EACH MEAL 4 TIMES DAILY AS DIRECTED TABLET ORAL
Qty: 240 EACH | Refills: 0 | Status: DISCONTINUED | COMMUNITY

## 2024-07-19 RX ORDER — NALOXONE HYDROCHLORIDE 4 MG/.1ML
PLEASE SEE ATTACHED FOR DETAILED DIRECTIONS SPRAY, METERED NASAL
Qty: 2 EACH | Refills: 0 | Status: ACTIVE | COMMUNITY

## 2024-07-19 RX ORDER — HYOSCYAMINE SULFATE 0.12 MG/1
1 OR 2 TABLETS TABLET ORAL EVERY 6 HOURS
Qty: 270 | Refills: 3 | Status: ACTIVE | COMMUNITY

## 2024-07-19 RX ORDER — HYDROCORTISONE 25 MG/G
CREAM TOPICAL
Qty: 30 GRAM | Status: ACTIVE | COMMUNITY

## 2024-07-19 RX ORDER — CYCLOBENZAPRINE HYDROCHLORIDE 10 MG/1
PLEASE SEE ATTACHED FOR DETAILED DIRECTIONS TABLET, FILM COATED ORAL
Qty: 45 EACH | Refills: 1 | Status: ACTIVE | COMMUNITY

## 2024-07-19 RX ORDER — SACUBITRIL AND VALSARTAN 24; 26 MG/1; MG/1
TABLET, FILM COATED ORAL
Qty: 180 TABLET | Status: DISCONTINUED | COMMUNITY

## 2024-07-19 RX ORDER — OMEPRAZOLE 40 MG/1
TAKE 1 CAPSULE BY MOUTH TWICE A DAY CAPSULE, DELAYED RELEASE ORAL
Qty: 180 EACH | Refills: 1 | Status: ACTIVE | COMMUNITY

## 2024-07-19 RX ORDER — HYDROXYZINE HYDROCHLORIDE 10 MG/1
TAKE 1 TABLET (10 MG) BY MOUTH IF NEEDED IN THE MORNING, AT NOON, AND AT BEDTIME FOR ITCHING TABLET, FILM COATED ORAL
Qty: 90 EACH | Refills: 0 | Status: ACTIVE | COMMUNITY

## 2024-07-19 RX ORDER — BOSENTAN 125 MG/1
TABLET, FILM COATED ORAL
Qty: 60 TABLET | Status: ACTIVE | COMMUNITY

## 2024-07-19 RX ORDER — DULOXETINE 20 MG/1
TAKE 1 CAPSULE (20 MG) BY MOUTH ONCE DAILY DO NOT CRUSH OR CHEW CAPSULE, DELAYED RELEASE ORAL
Qty: 90 EACH | Refills: 2 | Status: ACTIVE | COMMUNITY

## 2024-07-19 RX ORDER — LIDOCAINE 50 MG/G
PATCH CUTANEOUS
Qty: 30 PATCH | Status: DISCONTINUED | COMMUNITY

## 2024-07-19 RX ORDER — METHOCARBAMOL 500 MG/1
TAKE 1 TABLET (500 MG) BY MOUTH IF NEEDED IN THE MORNING, AT NOON, AND AT BEDTIME FOR MUSCLE SPASMS TABLET, FILM COATED ORAL
Qty: 60 EACH | Refills: 4 | Status: ACTIVE | COMMUNITY

## 2024-07-19 RX ORDER — INSULIN LISPRO 100 [IU]/ML
PLEASE SEE ATTACHED FOR DETAILED DIRECTIONS INJECTION, SOLUTION INTRAVENOUS; SUBCUTANEOUS
Qty: 15 MILLILITER | Refills: 0 | Status: DISCONTINUED | COMMUNITY

## 2024-07-19 RX ORDER — PREDNISONE 2.5 MG/1
TAKE 2 TABLETS (5 MG) BY MOUTH ONCE DAILY TABLET ORAL
Qty: 60 EACH | Refills: 0 | Status: ON HOLD | COMMUNITY

## 2024-07-19 NOTE — HPI-TODAY'S VISIT:
Patient here for GI followup. She is doing better at present. Her reflux symptoms are well controlled with omeprazole 40 mg once daily. She is not adherent to an anti-reflux regimen. She denies significant dysphagia at present. Her abdominal pain has improved though not entirely resolved, with use of hyoscyamine. She is only taking one pill once daily. She has not tried taking this more regularly. Her constipation has improved since last visit, and she is not currently taking anything for this. She has gained a few pounds since last visit. She has not tried drinking Glucerna. A KUB in June showed moderate constipation.   She apparently lost 25 pounds earlier this year while hospitalized in February. She had a noncontrasted CT abdomen pelvis in February which revealed a significant colonic stool burden but otherwise unremarkable. She denies anxiety or depression. She has a history of rheumatoid arthritis and Sjogren syndrome and is followed by a rheumatologist.  She is on Humira. Her last colonoscopy was in 2021; this was apparently unremarkable.  She does have end-stage renal disease and is on hemodialysis. She is on Eliquis.  She has pulmonary hypertension and apparently is on home oxygen.  She was admitted to [facility] in February with a 10-day history of loose stools and fecal incontinence and perianal numbness.  Stool studies were negative. She was seen by [facility] earlier this year, and was scheduled for anorectal manometry for the perianal numbness, but did not keep that appointment. There is no family history of colon cancer or polyps, per her report. She underwent EGD in early 2021 which was unremarkable. She apparently had a capsule endoscopy in 2021 which was also unremarkable, though neither of these reports are available. Recent labs showed a hemoglobin of 10.1, normal liver enzymes.

## 2024-07-19 NOTE — PHYSICAL EXAM GASTROINTESTINAL
Abdomen is soft, nontender, nondistended, no guarding or rigidity, no masses palpable, normal bowel sounds; mild tympany

## 2024-07-21 ENCOUNTER — LAB OUTSIDE AN ENCOUNTER (OUTPATIENT)
Dept: URBAN - METROPOLITAN AREA CLINIC 27 | Facility: CLINIC | Age: 74
End: 2024-07-21